# Patient Record
Sex: MALE | Race: BLACK OR AFRICAN AMERICAN | HISPANIC OR LATINO | Employment: UNEMPLOYED | URBAN - METROPOLITAN AREA
[De-identification: names, ages, dates, MRNs, and addresses within clinical notes are randomized per-mention and may not be internally consistent; named-entity substitution may affect disease eponyms.]

---

## 2019-04-30 ENCOUNTER — HOSPITAL ENCOUNTER (EMERGENCY)
Facility: HOSPITAL | Age: 29
Discharge: HOME | End: 2019-04-30
Attending: EMERGENCY MEDICINE

## 2019-04-30 VITALS
TEMPERATURE: 97 F | OXYGEN SATURATION: 99 % | HEART RATE: 67 BPM | WEIGHT: 202.16 LBS | RESPIRATION RATE: 16 BRPM | DIASTOLIC BLOOD PRESSURE: 80 MMHG | SYSTOLIC BLOOD PRESSURE: 123 MMHG

## 2019-04-30 DIAGNOSIS — H11.31 SUBCONJUNCTIVAL HEMORRHAGE OF RIGHT EYE: Primary | ICD-10-CM

## 2019-04-30 PROCEDURE — 99283 EMERGENCY DEPT VISIT LOW MDM: CPT | Performed by: EMERGENCY MEDICINE

## 2019-04-30 PROCEDURE — 99283 EMERGENCY DEPT VISIT LOW MDM: CPT

## 2023-08-01 ENCOUNTER — APPOINTMENT (EMERGENCY)
Dept: RADIOLOGY | Facility: HOSPITAL | Age: 33
End: 2023-08-01

## 2023-08-01 ENCOUNTER — HOSPITAL ENCOUNTER (EMERGENCY)
Facility: HOSPITAL | Age: 33
Discharge: HOME | End: 2023-08-01
Attending: EMERGENCY MEDICINE | Admitting: EMERGENCY MEDICINE

## 2023-08-01 VITALS
HEART RATE: 84 BPM | WEIGHT: 229.3 LBS | WEIGHT: 229.3 LBS | RESPIRATION RATE: 18 BRPM | TEMPERATURE: 97.7 F | RESPIRATION RATE: 18 BRPM | HEART RATE: 84 BPM | SYSTOLIC BLOOD PRESSURE: 143 MMHG | DIASTOLIC BLOOD PRESSURE: 78 MMHG | TEMPERATURE: 97.7 F | DIASTOLIC BLOOD PRESSURE: 78 MMHG | OXYGEN SATURATION: 95 % | SYSTOLIC BLOOD PRESSURE: 143 MMHG | OXYGEN SATURATION: 95 %

## 2023-08-01 DIAGNOSIS — R41.89 UNRESPONSIVE EPISODE: ICD-10-CM

## 2023-08-01 DIAGNOSIS — Z78.9 ALCOHOL USE: ICD-10-CM

## 2023-08-01 DIAGNOSIS — T40.2X1A OPIOID OVERDOSE (HCC): Primary | ICD-10-CM

## 2023-08-01 DIAGNOSIS — F14.10 COCAINE ABUSE (HCC): ICD-10-CM

## 2023-08-01 LAB
2HR DELTA HS TROPONIN: 2 NG/L
2HR DELTA HS TROPONIN: 2 NG/L
AMPHETAMINES SERPL QL SCN: NEGATIVE
AMPHETAMINES SERPL QL SCN: NEGATIVE
ANION GAP SERPL CALCULATED.3IONS-SCNC: 12 MMOL/L
ANION GAP SERPL CALCULATED.3IONS-SCNC: 12 MMOL/L
ATRIAL RATE: 130 BPM
ATRIAL RATE: 130 BPM
ATRIAL RATE: 87 BPM
ATRIAL RATE: 87 BPM
BARBITURATES UR QL: NEGATIVE
BARBITURATES UR QL: NEGATIVE
BASOPHILS # BLD MANUAL: 0 THOUSAND/UL (ref 0–0.1)
BASOPHILS # BLD MANUAL: 0 THOUSAND/UL (ref 0–0.1)
BASOPHILS NFR MAR MANUAL: 0 % (ref 0–1)
BASOPHILS NFR MAR MANUAL: 0 % (ref 0–1)
BENZODIAZ UR QL: NEGATIVE
BENZODIAZ UR QL: NEGATIVE
BUN SERPL-MCNC: 9 MG/DL (ref 5–25)
BUN SERPL-MCNC: 9 MG/DL (ref 5–25)
CALCIUM SERPL-MCNC: 8.3 MG/DL (ref 8.4–10.2)
CALCIUM SERPL-MCNC: 8.3 MG/DL (ref 8.4–10.2)
CARDIAC TROPONIN I PNL SERPL HS: 5 NG/L
CARDIAC TROPONIN I PNL SERPL HS: 5 NG/L
CARDIAC TROPONIN I PNL SERPL HS: 7 NG/L
CARDIAC TROPONIN I PNL SERPL HS: 7 NG/L
CHLORIDE SERPL-SCNC: 102 MMOL/L (ref 96–108)
CHLORIDE SERPL-SCNC: 102 MMOL/L (ref 96–108)
CO2 SERPL-SCNC: 23 MMOL/L (ref 21–32)
CO2 SERPL-SCNC: 23 MMOL/L (ref 21–32)
COCAINE UR QL: POSITIVE
COCAINE UR QL: POSITIVE
CREAT SERPL-MCNC: 0.9 MG/DL (ref 0.6–1.3)
CREAT SERPL-MCNC: 0.9 MG/DL (ref 0.6–1.3)
EOSINOPHIL # BLD MANUAL: 0.87 THOUSAND/UL (ref 0–0.4)
EOSINOPHIL # BLD MANUAL: 0.87 THOUSAND/UL (ref 0–0.4)
EOSINOPHIL NFR BLD MANUAL: 3 % (ref 0–6)
EOSINOPHIL NFR BLD MANUAL: 3 % (ref 0–6)
ERYTHROCYTE [DISTWIDTH] IN BLOOD BY AUTOMATED COUNT: 15.5 % (ref 11.6–15.1)
ERYTHROCYTE [DISTWIDTH] IN BLOOD BY AUTOMATED COUNT: 15.5 % (ref 11.6–15.1)
ETHANOL EXG-MCNC: 0.94 MG/DL
ETHANOL EXG-MCNC: 0.94 MG/DL
GFR SERPL CREATININE-BSD FRML MDRD: 49 ML/MIN/1.73SQ M
GFR SERPL CREATININE-BSD FRML MDRD: 49 ML/MIN/1.73SQ M
GLUCOSE SERPL-MCNC: 152 MG/DL (ref 65–140)
GLUCOSE SERPL-MCNC: 152 MG/DL (ref 65–140)
HCT VFR BLD AUTO: 48.1 % (ref 36.5–49.3)
HCT VFR BLD AUTO: 48.1 % (ref 36.5–49.3)
HGB BLD-MCNC: 15.9 G/DL (ref 12–17)
HGB BLD-MCNC: 15.9 G/DL (ref 12–17)
LYMPHOCYTES # BLD AUTO: 20 % (ref 14–44)
LYMPHOCYTES # BLD AUTO: 20 % (ref 14–44)
LYMPHOCYTES # BLD AUTO: 6.35 THOUSAND/UL (ref 0.6–4.47)
LYMPHOCYTES # BLD AUTO: 6.35 THOUSAND/UL (ref 0.6–4.47)
MCH RBC QN AUTO: 27.8 PG (ref 26.8–34.3)
MCH RBC QN AUTO: 27.8 PG (ref 26.8–34.3)
MCHC RBC AUTO-ENTMCNC: 33.1 G/DL (ref 31.4–37.4)
MCHC RBC AUTO-ENTMCNC: 33.1 G/DL (ref 31.4–37.4)
MCV RBC AUTO: 84 FL (ref 82–98)
MCV RBC AUTO: 84 FL (ref 82–98)
METHADONE UR QL: NEGATIVE
METHADONE UR QL: NEGATIVE
MONOCYTES # BLD AUTO: 2.6 THOUSAND/UL (ref 0–1.22)
MONOCYTES # BLD AUTO: 2.6 THOUSAND/UL (ref 0–1.22)
MONOCYTES NFR BLD: 9 % (ref 4–12)
MONOCYTES NFR BLD: 9 % (ref 4–12)
MYELOCYTES NFR BLD MANUAL: 2 % (ref 0–1)
MYELOCYTES NFR BLD MANUAL: 2 % (ref 0–1)
NEUTROPHILS # BLD MANUAL: 18.46 THOUSAND/UL (ref 1.85–7.62)
NEUTROPHILS # BLD MANUAL: 18.46 THOUSAND/UL (ref 1.85–7.62)
NEUTS BAND NFR BLD MANUAL: 2 % (ref 0–8)
NEUTS BAND NFR BLD MANUAL: 2 % (ref 0–8)
NEUTS SEG NFR BLD AUTO: 62 % (ref 43–75)
NEUTS SEG NFR BLD AUTO: 62 % (ref 43–75)
OPIATES UR QL SCN: NEGATIVE
OPIATES UR QL SCN: NEGATIVE
OXYCODONE+OXYMORPHONE UR QL SCN: NEGATIVE
OXYCODONE+OXYMORPHONE UR QL SCN: NEGATIVE
P AXIS: 50 DEGREES
P AXIS: 50 DEGREES
PCP UR QL: NEGATIVE
PCP UR QL: NEGATIVE
PLATELET # BLD AUTO: 480 THOUSANDS/UL (ref 149–390)
PLATELET # BLD AUTO: 480 THOUSANDS/UL (ref 149–390)
PLATELET BLD QL SMEAR: ABNORMAL
PLATELET BLD QL SMEAR: ABNORMAL
PMV BLD AUTO: 8.9 FL (ref 8.9–12.7)
PMV BLD AUTO: 8.9 FL (ref 8.9–12.7)
POTASSIUM SERPL-SCNC: 3.1 MMOL/L (ref 3.5–5.3)
POTASSIUM SERPL-SCNC: 3.1 MMOL/L (ref 3.5–5.3)
PR INTERVAL: 170 MS
PR INTERVAL: 170 MS
PR INTERVAL: 96 MS
PR INTERVAL: 96 MS
QRS AXIS: 44 DEGREES
QRS AXIS: 44 DEGREES
QRS AXIS: 60 DEGREES
QRS AXIS: 60 DEGREES
QRSD INTERVAL: 94 MS
QRSD INTERVAL: 94 MS
QRSD INTERVAL: 96 MS
QRSD INTERVAL: 96 MS
QT INTERVAL: 400 MS
QT INTERVAL: 400 MS
QT INTERVAL: 402 MS
QT INTERVAL: 402 MS
QTC INTERVAL: 481 MS
QTC INTERVAL: 481 MS
QTC INTERVAL: 591 MS
QTC INTERVAL: 591 MS
RBC # BLD AUTO: 5.71 MILLION/UL (ref 3.88–5.62)
RBC # BLD AUTO: 5.71 MILLION/UL (ref 3.88–5.62)
RBC MORPH BLD: NORMAL
RBC MORPH BLD: NORMAL
SODIUM SERPL-SCNC: 137 MMOL/L (ref 135–147)
SODIUM SERPL-SCNC: 137 MMOL/L (ref 135–147)
T WAVE AXIS: 28 DEGREES
T WAVE AXIS: 28 DEGREES
T WAVE AXIS: 34 DEGREES
T WAVE AXIS: 34 DEGREES
THC UR QL: POSITIVE
THC UR QL: POSITIVE
VARIANT LYMPHS # BLD AUTO: 2 %
VARIANT LYMPHS # BLD AUTO: 2 %
VENTRICULAR RATE: 130 BPM
VENTRICULAR RATE: 130 BPM
VENTRICULAR RATE: 87 BPM
VENTRICULAR RATE: 87 BPM
WBC # BLD AUTO: 28.85 THOUSAND/UL (ref 4.31–10.16)
WBC # BLD AUTO: 28.85 THOUSAND/UL (ref 4.31–10.16)

## 2023-08-01 PROCEDURE — 84484 ASSAY OF TROPONIN QUANT: CPT | Performed by: EMERGENCY MEDICINE

## 2023-08-01 PROCEDURE — 93005 ELECTROCARDIOGRAM TRACING: CPT

## 2023-08-01 PROCEDURE — 85007 BL SMEAR W/DIFF WBC COUNT: CPT | Performed by: EMERGENCY MEDICINE

## 2023-08-01 PROCEDURE — 93010 ELECTROCARDIOGRAM REPORT: CPT | Performed by: INTERNAL MEDICINE

## 2023-08-01 PROCEDURE — 99291 CRITICAL CARE FIRST HOUR: CPT | Performed by: EMERGENCY MEDICINE

## 2023-08-01 PROCEDURE — 82075 ASSAY OF BREATH ETHANOL: CPT | Performed by: EMERGENCY MEDICINE

## 2023-08-01 PROCEDURE — 85027 COMPLETE CBC AUTOMATED: CPT | Performed by: EMERGENCY MEDICINE

## 2023-08-01 PROCEDURE — 80048 BASIC METABOLIC PNL TOTAL CA: CPT | Performed by: EMERGENCY MEDICINE

## 2023-08-01 PROCEDURE — 71045 X-RAY EXAM CHEST 1 VIEW: CPT

## 2023-08-01 PROCEDURE — 36415 COLL VENOUS BLD VENIPUNCTURE: CPT | Performed by: EMERGENCY MEDICINE

## 2023-08-01 PROCEDURE — 96375 TX/PRO/DX INJ NEW DRUG ADDON: CPT

## 2023-08-01 PROCEDURE — 99284 EMERGENCY DEPT VISIT MOD MDM: CPT

## 2023-08-01 PROCEDURE — 96374 THER/PROPH/DIAG INJ IV PUSH: CPT

## 2023-08-01 PROCEDURE — 80307 DRUG TEST PRSMV CHEM ANLYZR: CPT | Performed by: EMERGENCY MEDICINE

## 2023-08-01 PROCEDURE — 82948 REAGENT STRIP/BLOOD GLUCOSE: CPT

## 2023-08-01 RX ORDER — ONDANSETRON 2 MG/ML
4 INJECTION INTRAMUSCULAR; INTRAVENOUS ONCE
Status: COMPLETED | OUTPATIENT
Start: 2023-08-01 | End: 2023-08-01

## 2023-08-01 RX ORDER — NALOXONE HYDROCHLORIDE 1 MG/ML
2 INJECTION INTRAMUSCULAR; INTRAVENOUS; SUBCUTANEOUS ONCE
Status: COMPLETED | OUTPATIENT
Start: 2023-08-01 | End: 2023-08-01

## 2023-08-01 RX ORDER — POTASSIUM CHLORIDE 750 MG/1
40 TABLET, EXTENDED RELEASE ORAL ONCE
Status: COMPLETED | OUTPATIENT
Start: 2023-08-01 | End: 2023-08-01

## 2023-08-01 RX ORDER — NICOTINE 21 MG/24HR
21 PATCH, TRANSDERMAL 24 HOURS TRANSDERMAL ONCE
Status: DISCONTINUED | OUTPATIENT
Start: 2023-08-01 | End: 2023-08-01 | Stop reason: HOSPADM

## 2023-08-01 RX ADMIN — NALOXONE HYDROCHLORIDE 2 MG: 1 INJECTION, SOLUTION INTRAMUSCULAR; INTRAVENOUS; SUBCUTANEOUS at 09:04

## 2023-08-01 RX ADMIN — POTASSIUM CHLORIDE 40 MEQ: 750 TABLET, EXTENDED RELEASE ORAL at 10:25

## 2023-08-01 RX ADMIN — NALOXONE HYDROCHLORIDE 4 MG: 4 SPRAY NASAL at 13:55

## 2023-08-01 RX ADMIN — ONDANSETRON 4 MG: 2 INJECTION INTRAMUSCULAR; INTRAVENOUS at 12:05

## 2023-08-01 RX ADMIN — NICOTINE 21 MG: 21 PATCH, EXTENDED RELEASE TRANSDERMAL at 09:49

## 2023-08-01 RX ADMIN — NALOXONE HYDROCHLORIDE 2 MG: 1 INJECTION, SOLUTION INTRAMUSCULAR; INTRAVENOUS; SUBCUTANEOUS at 09:06

## 2023-08-01 NOTE — ED NOTES
HOST called for pt, pt refused to speak with. Education provided on HOST services, pt adamantly refused.       Noni Augustine RN  08/01/23 1014

## 2023-08-01 NOTE — DISCHARGE INSTRUCTIONS
What ever you took today had cocaine and likely an opioid in it. This can be deadly. Do not use this anymore. Follow-up with CRS sources that were given to you.

## 2023-08-01 NOTE — ED PROVIDER NOTES
History  Chief Complaint   Patient presents with   • Overdose - Accidental     Taken out of care LOC       This is an unknown aged male who presents to the emergency department in car of his neighbor. She came to the  and advised that he had overdosed. She thought maybe he uses Percocet or some other substance. Upon arrival to the car the patient had agonal respirations, pupils were pinpoint. He was pulled from the car placed on the stretcher and bag valve respirations were initiated. Upon arrival to room in the emergency department the patient was given 2 mg of Narcan intranasal.  Upon establishment of IV the patient received another 2 mg of Narcan IV. The patient then awoke. He denies using any opioids. He states he drinks alcohol on a regular basis. He admits to having alcohol this morning. He states he uses marijuana on a regular basis. He also smokes cigarettes. Patient denies any oral opioids, no injectable, and has not snorted per his report. He states that he smoked marijuana this morning and states it tasted funny. He thinks it may have been laced with something. I repeatedly asked the patient if he has any exposure to opioids and he denies. None       History reviewed. No pertinent past medical history. History reviewed. No pertinent surgical history. History reviewed. No pertinent family history. I have reviewed and agree with the history as documented. E-Cigarette/Vaping     E-Cigarette/Vaping Substances     Social History     Tobacco Use   • Smoking status: Unknown   Substance Use Topics   • Alcohol use:  Yes   • Drug use: Yes     Types: Marijuana       Review of Systems    Physical Exam  Physical Exam    Vital Signs  ED Triage Vitals   Temperature Pulse Respirations Blood Pressure SpO2   08/01/23 0900 08/01/23 0900 08/01/23 0900 08/01/23 0900 08/01/23 0900   97.7 °F (36.5 °C) (!) 120 (!) 26 (!) 221/185 90 %      Temp Source Heart Rate Source Patient Position - Orthostatic VS BP Location FiO2 (%)   08/01/23 0900 08/01/23 0900 08/01/23 0900 08/01/23 0900 --   Tympanic Monitor Lying Left arm       Pain Score       08/01/23 1100       No Pain           Vitals:    08/01/23 0954 08/01/23 1100 08/01/23 1214 08/01/23 1334   BP: 161/83 143/87 169/90 143/78   Pulse: 101 89 90 84   Patient Position - Orthostatic VS: Lying Lying Lying Lying         Visual Acuity      ED Medications  Medications   nicotine (NICODERM CQ) 21 mg/24 hr TD 24 hr patch 21 mg (21 mg Transdermal Medication Applied 8/1/23 0949)   naloxone [facility]) intranasal 2 mg (2 mg Nasal Given 8/1/23 0904)   naloxone [facility]) injection 2 mg (2 mg Intravenous Given 8/1/23 0906)   potassium chloride (K-DUR,KLOR-CON) CR tablet 40 mEq (40 mEq Oral Given 8/1/23 1025)   naloxone nasal- Given to patient by provider at discharge. (NARCAN) 4 mg/0.1 mL nasal spray 4 mg (4 mg Does not apply Given by Other 8/1/23 1355)   naloxone nasal- Given to patient by provider at discharge.  (NARCAN) 4 mg/0.1 mL nasal spray 4 mg (4 mg Does not apply Given by Other 8/1/23 1207)   ondansetron (ZOFRAN) injection 4 mg (4 mg Intravenous Given 8/1/23 1205)       Diagnostic Studies  Results Reviewed     Procedure Component Value Units Date/Time    HS Troponin I 2hr [[id number]]  (Normal) Collected: 08/01/23 1251    Lab Status: Final result Specimen: Blood from Arm, Right Updated: 08/01/23 1326     hs TnI 2hr 7 ng/L      Delta 2hr hsTnI 2 ng/L     HS Troponin I 4hr [[id number]]     Lab Status: No result Specimen: Blood     HS Troponin 0hr (reflex protocol) [[id number]]  (Normal) Collected: 08/01/23 1028    Lab Status: Final result Specimen: Blood from Arm, Right Updated: 08/01/23 1110     hs TnI 0hr 5 ng/L     Manual Differential(PHLEBS Do Not Order) [[id number]]  (Abnormal) Collected: 08/01/23 0931    Lab Status: Final result Specimen: Blood from Arm, Right Updated: 08/01/23 1038     Segmented % 62 %      Bands % 2 %      Lymphocytes % 20 %      Monocytes % 9 %      Eosinophils, % 3 %      Basophils % 0 %      Myelocytes % 2 %      Atypical Lymphocytes % 2 %      Absolute Neutrophils 18.46 Thousand/uL      Lymphocytes Absolute 6.35 Thousand/uL      Monocytes Absolute 2.60 Thousand/uL      Eosinophils Absolute 0.87 Thousand/uL      Basophils Absolute 0.00 Thousand/uL      Total Counted --     RBC Morphology Normal     Platelet Estimate Increased    Rapid drug screen, urine [[id number]]  (Abnormal) Collected: 08/01/23 0953    Lab Status: Final result Specimen: Urine, Other Updated: 08/01/23 1023     Amph/Meth UR Negative     Barbiturate Ur Negative     Benzodiazepine Urine Negative     Cocaine Urine Positive     Methadone Urine Negative     Opiate Urine Negative     PCP Ur Negative     THC Urine Positive     Oxycodone Urine Negative    Narrative:      Presumptive report. If requested, specimen will be sent to reference lab for confirmation. FOR MEDICAL PURPOSES ONLY. IF CONFIRMATION NEEDED PLEASE CONTACT THE LAB WITHIN 5 DAYS.     Drug Screen Cutoff Levels:  AMPHETAMINE/METHAMPHETAMINES  1000 ng/mL  BARBITURATES     200 ng/mL  BENZODIAZEPINES     200 ng/mL  COCAINE      300 ng/mL  METHADONE      300 ng/mL  OPIATES      300 ng/mL  PHENCYCLIDINE     25 ng/mL  THC       50 ng/mL  OXYCODONE      100 ng/mL    Basic metabolic panel [[id number]]  (Abnormal) Collected: 08/01/23 0931    Lab Status: Final result Specimen: Blood from Arm, Right Updated: 08/01/23 0959     Sodium 137 mmol/L      Potassium 3.1 mmol/L      Chloride 102 mmol/L      CO2 23 mmol/L      ANION GAP 12 mmol/L      BUN 9 mg/dL      Creatinine 0.90 mg/dL      Glucose 152 mg/dL      Calcium 8.3 mg/dL      eGFR 49 ml/min/1.73sq m     Narrative:      [place] guidelines for Chronic Kidney Disease (CKD):   •  Stage 1 with normal or high GFR (GFR > 90 mL/min/1.73 square meters)  •  Stage 2 Mild CKD (GFR = 60-89 mL/min/1.73 square meters)  •  Stage 3A Moderate CKD (GFR = 45-59 mL/min/1.73 square meters)  •  Stage 3B Moderate CKD (GFR = 30-44 mL/min/1.73 square meters)  •  Stage 4 Severe CKD (GFR = 15-29 mL/min/1.73 square meters)  •  Stage 5 End Stage CKD (GFR <15 mL/min/1.73 square meters)  Note: GFR calculation is accurate only with a steady state creatinine    CBC and differential [[id number]]  (Abnormal) Collected: 08/01/23 0931    Lab Status: Final result Specimen: Blood from Arm, Right Updated: 08/01/23 0956     WBC 28.85 Thousand/uL      RBC 5.71 Million/uL      Hemoglobin 15.9 g/dL      Hematocrit 48.1 %      MCV 84 fL      MCH 27.8 pg      MCHC 33.1 g/dL      RDW 15.5 %      MPV 8.9 fL      Platelets 046 Thousands/uL     POCT alcohol breath test [[id number]]  (Normal) Resulted: 08/01/23 0927    Lab Status: Final result Updated: 08/01/23 0927     EXTBreath Alcohol 0.94                 XR chest 1 view portable   ED Interpretation by Dov Mariee MD (08/01 1020)   No infiltrate. Final Result by Sarah Jackson MD (08/01 1518)      No focal consolidation, pleural effusion, or pneumothorax. Resident: Avril Chen, the attending radiologist, have reviewed the images and agree with the final report above.       Workstation performed: [id number]                    Procedures  CriticalCare Time    Date/Time: 8/1/2023 3:31 PM    Performed by: Dov Mariee MD  Authorized by: Dov Mariee MD    Critical care provider statement:     Critical care time (minutes):  45    Critical care time was exclusive of:  Separately billable procedures and treating other patients and teaching time    Critical care was necessary to treat or prevent imminent or life-threatening deterioration of the following conditions:  Toxidrome and respiratory failure    Critical care was time spent personally by me on the following activities:  Obtaining history from patient or surrogate, development of treatment plan with patient or surrogate, discussions with consultants, examination of patient, evaluation of patient's response to treatment, interpretation of cardiac output measurements, ordering and performing treatments and interventions, ordering and review of laboratory studies, ordering and review of radiographic studies and re-evaluation of patient's condition  ECG 12 Lead Documentation Only    Date/Time: 8/1/2023 9:13 AM    Performed by: Terra Pineda MD  Authorized by: Terra Pineda MD    Indications / Diagnosis:  Overdose, unresponsive  ECG reviewed by me, the ED Provider: yes    Patient location:  ED  Rate:     ECG rate assessment: tachycardic    Rhythm:     Rhythm: sinus rhythm    Ectopy:     Ectopy: none    Conduction:     Conduction: normal    ST segments:     ST segments:  Normal  T waves:     T waves: non-specific    ECG 12 Lead Documentation Only    Date/Time: 8/1/2023 12:53 PM    Performed by: Terra Pineda MD  Authorized by: Terra Pineda MD    Indications / Diagnosis:  Overdose  ECG reviewed by me, the ED Provider: yes    Patient location:  ED  Rate:     ECG rate assessment: normal    Rhythm:     Rhythm: sinus rhythm    Ectopy:     Ectopy: none    QRS:     QRS axis:  Normal    QRS intervals:  Normal  Conduction:     Conduction: normal    ST segments:     ST segments:  Normal  T waves:     T waves: inverted      Inverted:  III             ED Course  ED Course as of 08/01/23 1536   Tue Aug 01, 2023   0921 Consult placed to [address] [place] [address]. Warm handoff referral was made. I repeatedly discussed with patient that his overdose was reversed with Narcan and there is concern for opioid. The patient is unwilling to seek detox at this time. He is willing to stay for monitoring. He will not provide his name. The neighbor who brought him in only referred to him as "chino ". Patient states he is only using marijuana and believes somebody may have laced it with something. He says it tasted funny.   He repeatedly denies using any oral opioid medications, does not inhale anything other than marijuana, he does drink alcohol but denies wanting detox for this. He denies injecting drugs. 8820 Patient is requesting nicotine patch. 0944 HOST is on the phone and will attempt to speak with patient. 0944 WBC(!): 28.85  WBC 28 - likely stress reaction. Patient is afebrile. Denies any recent illness. 0946 BP has improved. .   [id number] Patient refused to speak with HOST when they called. 1010 Potassium low. Will replace. 1010 CRS is here to speak with patient. 1025 UDS is positive for cocaine. Patient was advised of this and likely fentanyl combination given presentation and fentanyl does not show up as positive routinely on this UDS. THC was also positive on urine drug screen. Patient maintains that he only smoked marijuana. He was advised that we will be checking his heart as there is concern for heart injury with cocaine use. He is agreeable to staying for evaluation. 1041 Patient was seen by CRS. Patient will follow-up with her as outpatient. Does not want detox/rehab at this time. We will give community dispensed Narcan. Still evaluating cardiac eval at this time. 1341 Patient is requesting to go home. Does not want detox/rehab. Has follow-up information with CRS for support with sobriety. Delta troponin is 2. HEART score:    History 1=Moderate suspicious   ECG 1=Nonspecific repolarization disturbance   Age 0= < 45 years   Risk Factors 1= 1 or 2 risk factors   Troponin 0= Less than or equal to 12 ng/L   Total 3                              SBIRT 20yo+    Flowsheet Row Most Recent Value   Initial Alcohol Screen: US AUDIT-C     1. How often do you have a drink containing alcohol? 3 Filed at: 08/01/2023 0928   2. How many drinks containing alcohol do you have on a typical day you are drinking? 3 Filed at: 08/01/2023 0928   3a. Male UNDER 65: How often do you have five or more drinks on one occasion?  3 Filed at: 08/01/2023 0928   3b. FEMALE Any Age, or MALE 65+: How often do you have 4 or more drinks on one occassion? 0 Filed at: 08/01/2023 0928   Audit-C Score 9 Filed at: 08/01/2023 2452   Full Alcohol Screen: US AUDIT    4. How often during the last year have you found that you were not able to stop drinking once you had started? 0 Filed at: 08/01/2023 0928   5. How often during past year have you failed to do what was normally expected of you because of drinking? 0 Filed at: 08/01/2023 0928   6. How often in past year have you needed a first drink in the morning to get yourself going after a heavy drinking session? 0 Filed at: 08/01/2023 0928   7. How often in past year have you had feeling of guilt or remorse after drinking? 0 Filed at: 08/01/2023 0928   8. How often in past year have you been unable to remember what happened night before because you had been drinking? 0 Filed at: 08/01/2023 0928   9. Have you or someone else been injured as a result of your drinking? 0 Filed at: 08/01/2023 0928   10. Has a relative, friend, doctor or other health worker been concerned about your drinking and suggested you cut down?  0 Filed at: 08/01/2023 2673   AUDIT Total Score 9 Filed at: 08/01/2023 0399   CHASE: How many times in the past year have you. .. Used an illegal drug or used a prescription medication for non-medical reasons? Never Filed at: 08/01/2023 [address] emergency department with overdose of what is believed to be an opioid. Responded to 2 doses of Narcan. Required bag valve ventilation when he was extricated from car. Patient required my immediate presence at bedside. Case was discussed with CRS team in ED. Patient had cardiac evaluation his UDS noted him to be positive for cocaine and THC. Suspected likely fentanyl overdose as he did not test positive for other opioid. Patient did not want detox or rehab at this time. Agrees with follow-up with CRS.   Patient was advised risk of drug use especially whenever he was in contact with today as he states he believes this was only marijuana. He was advised to safely dispose of this to prevent any further overdose. Patient improved while in the emergency department. Delta troponin was normal.  Patient was offered inpatient detox or inpatient rehab and declined. Amount and/or Complexity of Data Reviewed  Independent Historian: friend     Details: Initial history was per neighbor Hernan Love who brought patient in and at that time he was unconscious and unresponsive in the passenger seat of her car. External Data Reviewed: notes. Details: No notes were initially available as patient presented as a unknown person and registered for such. Labs: ordered. Decision-making details documented in ED Course. Radiology: ordered and independent interpretation performed. ECG/medicine tests: ordered and independent interpretation performed. Risk  OTC drugs. Prescription drug management. Decision regarding hospitalization. Disposition  Final diagnoses:   Opioid overdose ([address])   Cocaine abuse ([address])   Alcohol use   Unresponsive episode     Time reflects when diagnosis was documented in both MDM as applicable and the Disposition within this note     Time User Action Codes Description Comment    8/1/2023 10:32 AM Elbert Heather Add [T40.2X1A] Opioid overdose ([address])     8/1/2023 10:33 AM Elbert Heather Add [F14.10] Cocaine abuse ([address])     8/1/2023  1:50 PM Elbert Heather Add [Z78.9] Alcohol use     8/1/2023  1:50 PM Elbert [place] Add [R41.89] Unresponsive episode       ED Disposition     ED Disposition   Discharge    Condition   Stable    Date/Time   Tue Aug 1, 2023  1:44 PM    Comment   Evan Angel discharge to home/self care.                Follow-up Information     Follow up With Specialties Details Why [address]  2nd Floor Family Medicine Schedule an appointment as soon as possible for a visit in 1 week For re-evaluation and follow-up for this visit [address] [facility] [address] [facility] [address]  [phone]            There are no discharge medications for this patient. No discharge procedures on file.     PDMP Review     None          ED Provider  Electronically Signed by           Fern Carrasco MD  08/01/23 4872

## 2023-08-01 NOTE — ED CARE HANDOFF
[address] [facility] Warm Handoff Outcome Note    Patient name Susan Douglass [address] [place] [address]  Location ED 01/ED 01 MRN [id number]  Age: 80 y.o. Plan Type:   Warm Handoff                                                                                    Plan Date: 8/1/2023  Service:  ED Warm Handoff      Substance Use History:  Opiates    Warm Handoff Update:  Pt would not give any personal information;  HOST reached out by phone but pt declined any services    Warm Handoff Outcome: Patient Refused

## 2023-08-02 ENCOUNTER — TELEPHONE (OUTPATIENT)
Dept: PSYCHIATRY | Facility: CLINIC | Age: 33
End: 2023-08-02

## 2023-08-02 LAB
GLUCOSE SERPL-MCNC: 151 MG/DL (ref 65–140)
GLUCOSE SERPL-MCNC: 151 MG/DL (ref 65–140)

## 2023-08-02 NOTE — TELEPHONE ENCOUNTER
Met with patient in ED for current OD. Patient denies and use of opioids, was smoking marijuana and had a few drinks. Patient was scared and tearful during meeting. Patient and I discussed the dangers of substance use and was open to listen to my recovery experience.    Provided Erasto with my phone number to follow up with outpatient CRS guidance to help address substance/alcohol use as well as learning some coping skills   CRS 08/01/2023